# Patient Record
Sex: MALE | URBAN - METROPOLITAN AREA
[De-identification: names, ages, dates, MRNs, and addresses within clinical notes are randomized per-mention and may not be internally consistent; named-entity substitution may affect disease eponyms.]

---

## 2021-05-09 ENCOUNTER — OUT OF OFFICE VISIT (OUTPATIENT)
Dept: URBAN - METROPOLITAN AREA MEDICAL CENTER 30 | Facility: MEDICAL CENTER | Age: 61
End: 2021-05-09
Payer: COMMERCIAL

## 2021-05-09 DIAGNOSIS — I82.409 DVT (DEEP VENOUS THROMBOSIS): ICD-10-CM

## 2021-05-09 DIAGNOSIS — C73 THYROID CANCER: ICD-10-CM

## 2021-05-09 DIAGNOSIS — K83.8 ACQUIRED DILATION OF BILE DUCT: ICD-10-CM

## 2021-05-09 PROCEDURE — G8427 DOCREV CUR MEDS BY ELIG CLIN: HCPCS | Performed by: STUDENT IN AN ORGANIZED HEALTH CARE EDUCATION/TRAINING PROGRAM

## 2021-05-09 PROCEDURE — 99221 1ST HOSP IP/OBS SF/LOW 40: CPT | Performed by: STUDENT IN AN ORGANIZED HEALTH CARE EDUCATION/TRAINING PROGRAM

## 2023-01-30 ENCOUNTER — CLAIMS CREATED FROM THE CLAIM WINDOW (OUTPATIENT)
Dept: URBAN - METROPOLITAN AREA MEDICAL CENTER 9 | Facility: MEDICAL CENTER | Age: 63
End: 2023-01-30
Payer: COMMERCIAL

## 2023-01-30 DIAGNOSIS — K55.029 ACUTE INFARCTION OF SMALL INTESTINE, EXTENT UNSPECIFIED: ICD-10-CM

## 2023-01-30 DIAGNOSIS — K92.1 MELENA: ICD-10-CM

## 2023-01-30 DIAGNOSIS — D64.89 ANEMIA DUE TO OTHER CAUSE: ICD-10-CM

## 2023-01-30 DIAGNOSIS — D64.9 ANEMIA, UNSPECIFIED: ICD-10-CM

## 2023-01-30 PROCEDURE — 99254 IP/OBS CNSLTJ NEW/EST MOD 60: CPT | Performed by: INTERNAL MEDICINE

## 2023-01-30 PROCEDURE — 99222 1ST HOSP IP/OBS MODERATE 55: CPT | Performed by: INTERNAL MEDICINE

## 2023-01-30 PROCEDURE — G8427 DOCREV CUR MEDS BY ELIG CLIN: HCPCS | Performed by: INTERNAL MEDICINE

## 2023-01-31 ENCOUNTER — CLAIMS CREATED FROM THE CLAIM WINDOW (OUTPATIENT)
Dept: URBAN - METROPOLITAN AREA MEDICAL CENTER 9 | Facility: MEDICAL CENTER | Age: 63
End: 2023-01-31
Payer: COMMERCIAL

## 2023-01-31 DIAGNOSIS — D64.9 ANEMIA, UNSPECIFIED: ICD-10-CM

## 2023-01-31 DIAGNOSIS — K92.1 MELENA: ICD-10-CM

## 2023-01-31 DIAGNOSIS — D64.89 ANEMIA DUE TO OTHER CAUSE: ICD-10-CM

## 2023-01-31 DIAGNOSIS — U07.1 2019 NOVEL CORONAVIRUS DETECTED: ICD-10-CM

## 2023-01-31 PROCEDURE — 99232 SBSQ HOSP IP/OBS MODERATE 35: CPT | Performed by: INTERNAL MEDICINE

## 2023-01-31 PROCEDURE — 99232 SBSQ HOSP IP/OBS MODERATE 35: CPT | Performed by: PHYSICIAN ASSISTANT

## 2023-02-01 ENCOUNTER — CLAIMS CREATED FROM THE CLAIM WINDOW (OUTPATIENT)
Dept: URBAN - METROPOLITAN AREA MEDICAL CENTER 9 | Facility: MEDICAL CENTER | Age: 63
End: 2023-02-01
Payer: COMMERCIAL

## 2023-02-01 DIAGNOSIS — K92.1 MELENA: ICD-10-CM

## 2023-02-01 DIAGNOSIS — R74.8 ABNORMAL ALKALINE PHOSPHATASE TEST: ICD-10-CM

## 2023-02-01 DIAGNOSIS — U07.1 2019 NOVEL CORONAVIRUS DETECTED: ICD-10-CM

## 2023-02-01 DIAGNOSIS — D64.9 ANEMIA, UNSPECIFIED: ICD-10-CM

## 2023-02-01 DIAGNOSIS — D64.89 ANEMIA DUE TO OTHER CAUSE: ICD-10-CM

## 2023-02-01 PROCEDURE — 99232 SBSQ HOSP IP/OBS MODERATE 35: CPT | Performed by: INTERNAL MEDICINE

## 2023-02-01 PROCEDURE — 99232 SBSQ HOSP IP/OBS MODERATE 35: CPT | Performed by: PHYSICIAN ASSISTANT

## 2024-07-12 ENCOUNTER — DASHBOARD ENCOUNTERS (OUTPATIENT)
Age: 64
End: 2024-07-12

## 2024-07-12 ENCOUNTER — OFFICE VISIT (OUTPATIENT)
Dept: URBAN - METROPOLITAN AREA CLINIC 40 | Facility: CLINIC | Age: 64
End: 2024-07-12
Payer: COMMERCIAL

## 2024-07-12 ENCOUNTER — LAB OUTSIDE AN ENCOUNTER (OUTPATIENT)
Dept: URBAN - METROPOLITAN AREA CLINIC 40 | Facility: CLINIC | Age: 64
End: 2024-07-12

## 2024-07-12 VITALS
HEIGHT: 68 IN | HEART RATE: 87 BPM | BODY MASS INDEX: 25.16 KG/M2 | DIASTOLIC BLOOD PRESSURE: 88 MMHG | WEIGHT: 166 LBS | TEMPERATURE: 96.3 F | SYSTOLIC BLOOD PRESSURE: 130 MMHG

## 2024-07-12 DIAGNOSIS — Z12.11 COLON CANCER SCREENING: ICD-10-CM

## 2024-07-12 DIAGNOSIS — Z86.718 HX OF DEEP VENOUS THROMBOSIS: ICD-10-CM

## 2024-07-12 DIAGNOSIS — Z90.49 HX OF RESECTION OF SMALL BOWEL: ICD-10-CM

## 2024-07-12 DIAGNOSIS — Z79.01 BLOOD THINNED DUE TO LONG-TERM ANTICOAGULANT USE: ICD-10-CM

## 2024-07-12 PROBLEM — 347861000119105: Status: ACTIVE | Noted: 2024-07-12

## 2024-07-12 PROCEDURE — 99203 OFFICE O/P NEW LOW 30 MIN: CPT | Performed by: INTERNAL MEDICINE

## 2024-07-12 RX ORDER — VALSARTAN AND HYDROCHLOROTHIAZIDE 160; 12.5 MG/1; MG/1
TABLET, FILM COATED ORAL
Qty: 90 TABLET | Status: ACTIVE | COMMUNITY

## 2024-07-12 RX ORDER — FENOFIBRATE 160 MG/1
TABLET ORAL
Qty: 90 TABLET | Status: ACTIVE | COMMUNITY

## 2024-07-12 RX ORDER — CALCITRIOL 0.25 UG/1
CAPSULE, LIQUID FILLED ORAL
Qty: 270 CAPSULE | Status: ACTIVE | COMMUNITY

## 2024-07-12 RX ORDER — LEVOTHYROXINE SODIUM 125 UG/1
TABLET ORAL
Qty: 96 TABLET | Status: ACTIVE | COMMUNITY

## 2024-07-12 RX ORDER — APIXABAN 5 MG/1
TAKE TWO TABLETS BY MOUTH TWICE A DAY FOR 7 DAYS THEN REDUCE TO TAKE ONE TABLET BY MOUTH TWICE A DAY TABLET, FILM COATED ORAL
Qty: 180 UNSPECIFIED | Refills: 0 | Status: ACTIVE | COMMUNITY

## 2024-07-12 NOTE — HPI-TODAY'S VISIT:
--The patient presents on referral for colonoscopy.    --A copy of this document will be sent to the referring provider.    --The patient has not  had colonoscopy before.    --The patient does not have any risk factors for colon cancer, but is over the recommended age for screening.  There is no recent history of rectal bleeding.  The patient has no pertinent additional complaints of abdominal pain, constipation, diarrhea, or weight loss.  --1. Recurrent VTE Hypercoag w/u:             A. 5/21 PE intermediate risk with LLE DVT possibly due to COVID vaccine s/p catheter directed thrombolysis->Eliquis x 6 months->ASA            B. 1/23 SMV thrombus with small bowel ischemia ->Eliquis2. Met papillary thyroid ca-6/17 S/p total thyroidectomyb/l modified neck dissection Path vasu PTC 4.5cm (R) and 5.4cm (L) (prior to surgery) CT Chest multiple subcm pulm S /p RODRIGUEZ 178mCi as of 9/7/2017 s/p XRT to p ost-operative bed, bilateral neck levels II-V and level VI to level of kristina->obs3. Hypothyroidism/hypoPTH- post surgical Dr Fady Henao/[place]4. Osteopenia5. HTN6. HL7. Small bowel resection sec ischemia 1/23 post op complicated by resp failure s/p trach